# Patient Record
Sex: MALE
[De-identification: names, ages, dates, MRNs, and addresses within clinical notes are randomized per-mention and may not be internally consistent; named-entity substitution may affect disease eponyms.]

---

## 2022-07-25 ENCOUNTER — APPOINTMENT (OUTPATIENT)
Dept: OTOLARYNGOLOGY | Facility: CLINIC | Age: 77
End: 2022-07-25

## 2022-07-25 PROBLEM — Z00.00 ENCOUNTER FOR PREVENTIVE HEALTH EXAMINATION: Status: ACTIVE | Noted: 2022-07-25

## 2025-06-25 ENCOUNTER — INPATIENT (INPATIENT)
Facility: HOSPITAL | Age: 80
LOS: 0 days | Discharge: HOME HEALTH | End: 2025-06-26
Attending: INTERNAL MEDICINE | Admitting: INTERNAL MEDICINE
Payer: MEDICAID

## 2025-06-25 ENCOUNTER — APPOINTMENT (OUTPATIENT)
Dept: CT IMAGING | Facility: HOSPITAL | Age: 80
End: 2025-06-25

## 2025-06-25 ENCOUNTER — OUTPATIENT (OUTPATIENT)
Dept: OUTPATIENT SERVICES | Facility: HOSPITAL | Age: 80
LOS: 1 days | End: 2025-06-25

## 2025-06-25 VITALS
WEIGHT: 145.06 LBS | SYSTOLIC BLOOD PRESSURE: 103 MMHG | HEIGHT: 66 IN | RESPIRATION RATE: 17 BRPM | OXYGEN SATURATION: 94 % | TEMPERATURE: 98 F | DIASTOLIC BLOOD PRESSURE: 61 MMHG | HEART RATE: 78 BPM

## 2025-06-25 DIAGNOSIS — D69.6 THROMBOCYTOPENIA, UNSPECIFIED: ICD-10-CM

## 2025-06-25 DIAGNOSIS — J84.9 INTERSTITIAL PULMONARY DISEASE, UNSPECIFIED: ICD-10-CM

## 2025-06-25 DIAGNOSIS — D64.9 ANEMIA, UNSPECIFIED: ICD-10-CM

## 2025-06-25 DIAGNOSIS — S01.511A LACERATION WITHOUT FOREIGN BODY OF LIP, INITIAL ENCOUNTER: ICD-10-CM

## 2025-06-25 DIAGNOSIS — N40.0 BENIGN PROSTATIC HYPERPLASIA WITHOUT LOWER URINARY TRACT SYMPTOMS: ICD-10-CM

## 2025-06-25 DIAGNOSIS — D72.829 ELEVATED WHITE BLOOD CELL COUNT, UNSPECIFIED: ICD-10-CM

## 2025-06-25 LAB
ALBUMIN SERPL ELPH-MCNC: 3.1 G/DL — LOW (ref 3.3–5)
ALP SERPL-CCNC: 80 U/L — SIGNIFICANT CHANGE UP (ref 40–120)
ALT FLD-CCNC: 24 U/L — SIGNIFICANT CHANGE UP (ref 12–78)
ANION GAP SERPL CALC-SCNC: 5 MMOL/L — SIGNIFICANT CHANGE UP (ref 5–17)
ANISOCYTOSIS BLD QL: SLIGHT — SIGNIFICANT CHANGE UP
APPEARANCE UR: ABNORMAL
APTT BLD: 25.8 SEC — LOW (ref 26.1–36.8)
AST SERPL-CCNC: 30 U/L — SIGNIFICANT CHANGE UP (ref 15–37)
BACTERIA # UR AUTO: ABNORMAL /HPF
BASOPHILS # BLD AUTO: 0 K/UL — SIGNIFICANT CHANGE UP (ref 0–0.2)
BASOPHILS NFR BLD AUTO: 0 % — SIGNIFICANT CHANGE UP (ref 0–2)
BILIRUB SERPL-MCNC: 0.6 MG/DL — SIGNIFICANT CHANGE UP (ref 0.2–1.2)
BILIRUB UR-MCNC: NEGATIVE — SIGNIFICANT CHANGE UP
BLD GP AB SCN SERPL QL: SIGNIFICANT CHANGE UP
BUN SERPL-MCNC: 14 MG/DL — SIGNIFICANT CHANGE UP (ref 7–23)
CALCIUM SERPL-MCNC: 8.4 MG/DL — LOW (ref 8.5–10.1)
CHLORIDE SERPL-SCNC: 103 MMOL/L — SIGNIFICANT CHANGE UP (ref 96–108)
CO2 SERPL-SCNC: 27 MMOL/L — SIGNIFICANT CHANGE UP (ref 22–31)
COLOR SPEC: YELLOW — SIGNIFICANT CHANGE UP
CREAT SERPL-MCNC: 1.12 MG/DL — SIGNIFICANT CHANGE UP (ref 0.5–1.3)
DIFF PNL FLD: NEGATIVE — SIGNIFICANT CHANGE UP
EGFR: 67 ML/MIN/1.73M2 — SIGNIFICANT CHANGE UP
EGFR: 67 ML/MIN/1.73M2 — SIGNIFICANT CHANGE UP
EOSINOPHIL # BLD AUTO: 0.44 K/UL — SIGNIFICANT CHANGE UP (ref 0–0.5)
EOSINOPHIL NFR BLD AUTO: 2 % — SIGNIFICANT CHANGE UP (ref 0–6)
EPI CELLS # UR: PRESENT
GLUCOSE SERPL-MCNC: 162 MG/DL — HIGH (ref 70–99)
GLUCOSE UR QL: NEGATIVE MG/DL — SIGNIFICANT CHANGE UP
HCT VFR BLD CALC: 29.9 % — LOW (ref 39–50)
HGB BLD-MCNC: 9.7 G/DL — LOW (ref 13–17)
INR BLD: 1.15 RATIO — SIGNIFICANT CHANGE UP (ref 0.85–1.16)
KETONES UR QL: NEGATIVE MG/DL — SIGNIFICANT CHANGE UP
LEUKOCYTE ESTERASE UR-ACNC: ABNORMAL
LYMPHOCYTES # BLD AUTO: 13 % — SIGNIFICANT CHANGE UP (ref 13–44)
LYMPHOCYTES # BLD AUTO: 2.85 K/UL — SIGNIFICANT CHANGE UP (ref 1–3.3)
MANUAL SMEAR VERIFICATION: SIGNIFICANT CHANGE UP
MCHC RBC-ENTMCNC: 32.4 G/DL — SIGNIFICANT CHANGE UP (ref 32–36)
MCHC RBC-ENTMCNC: 40.2 PG — HIGH (ref 27–34)
MCV RBC AUTO: 124.1 FL — HIGH (ref 80–100)
MICROCYTES BLD QL: SLIGHT — SIGNIFICANT CHANGE UP
MONOCYTES # BLD AUTO: 7.01 K/UL — HIGH (ref 0–0.9)
MONOCYTES NFR BLD AUTO: 32 % — HIGH (ref 2–14)
NEUTROPHILS # BLD AUTO: 10.96 K/UL — HIGH (ref 1.8–7.4)
NEUTROPHILS NFR BLD AUTO: 50 % — SIGNIFICANT CHANGE UP (ref 43–77)
NITRITE UR-MCNC: NEGATIVE — SIGNIFICANT CHANGE UP
NRBC # BLD: 0 /100 WBCS — SIGNIFICANT CHANGE UP (ref 0–0)
NRBC BLD AUTO-RTO: SIGNIFICANT CHANGE UP /100 WBCS (ref 0–0)
NRBC BLD-RTO: 0 /100 WBCS — SIGNIFICANT CHANGE UP (ref 0–0)
PH UR: 7.5 — SIGNIFICANT CHANGE UP (ref 5–8)
PLAT MORPH BLD: ABNORMAL
PLATELET # BLD AUTO: 61 K/UL — LOW (ref 150–400)
POIKILOCYTOSIS BLD QL AUTO: SLIGHT — SIGNIFICANT CHANGE UP
POLYCHROMASIA BLD QL SMEAR: SLIGHT — SIGNIFICANT CHANGE UP
POTASSIUM SERPL-MCNC: 3.9 MMOL/L — SIGNIFICANT CHANGE UP (ref 3.5–5.3)
POTASSIUM SERPL-SCNC: 3.9 MMOL/L — SIGNIFICANT CHANGE UP (ref 3.5–5.3)
PROT SERPL-MCNC: 7.8 GM/DL — SIGNIFICANT CHANGE UP (ref 6–8.3)
PROT UR-MCNC: 30 MG/DL
PROTHROM AB SERPL-ACNC: 13.3 SEC — SIGNIFICANT CHANGE UP (ref 9.9–13.4)
RBC # BLD: 2.41 M/UL — LOW (ref 4.2–5.8)
RBC # FLD: 20.4 % — HIGH (ref 10.3–14.5)
RBC BLD AUTO: SIGNIFICANT CHANGE UP
RBC CASTS # UR COMP ASSIST: 2 /HPF — SIGNIFICANT CHANGE UP (ref 0–4)
SMUDGE CELLS # BLD: PRESENT — SIGNIFICANT CHANGE UP
SODIUM SERPL-SCNC: 135 MMOL/L — SIGNIFICANT CHANGE UP (ref 135–145)
SP GR SPEC: 1.01 — SIGNIFICANT CHANGE UP (ref 1–1.03)
SPHEROCYTES BLD QL SMEAR: SLIGHT — SIGNIFICANT CHANGE UP
UROBILINOGEN FLD QL: 0.2 MG/DL — SIGNIFICANT CHANGE UP (ref 0.2–1)
VARIANT LYMPHS # BLD: 3 % — SIGNIFICANT CHANGE UP (ref 0–6)
VARIANT LYMPHS NFR BLD MANUAL: 3 % — SIGNIFICANT CHANGE UP (ref 0–6)
WBC # BLD: 21.92 K/UL — HIGH (ref 3.8–10.5)
WBC # FLD AUTO: 21.92 K/UL — HIGH (ref 3.8–10.5)
WBC UR QL: 10 /HPF — HIGH (ref 0–5)

## 2025-06-25 PROCEDURE — 99222 1ST HOSP IP/OBS MODERATE 55: CPT

## 2025-06-25 PROCEDURE — 99285 EMERGENCY DEPT VISIT HI MDM: CPT

## 2025-06-25 PROCEDURE — 93010 ELECTROCARDIOGRAM REPORT: CPT

## 2025-06-25 PROCEDURE — 71045 X-RAY EXAM CHEST 1 VIEW: CPT | Mod: 26

## 2025-06-25 RX ORDER — OMEGA-3-ACID ETHYL ESTERS CAPSULES 1 G/1
1 CAPSULE, LIQUID FILLED ORAL
Refills: 0 | DISCHARGE

## 2025-06-25 RX ORDER — BUDESONIDE AND FORMOTEROL FUMARATE DIHYDRATE 80; 4.5 UG/1; UG/1
2 AEROSOL RESPIRATORY (INHALATION)
Refills: 0 | DISCHARGE

## 2025-06-25 RX ORDER — PSYLLIUM SEED (WITH DEXTROSE)
1 POWDER (GRAM) ORAL THREE TIMES A DAY
Refills: 0 | Status: DISCONTINUED | OUTPATIENT
Start: 2025-06-25 | End: 2025-06-26

## 2025-06-25 RX ORDER — DONEPEZIL HYDROCHLORIDE 5 MG/1
1 TABLET ORAL
Refills: 0 | DISCHARGE

## 2025-06-25 RX ORDER — MAGNESIUM, ALUMINUM HYDROXIDE 200-200 MG
30 TABLET,CHEWABLE ORAL EVERY 4 HOURS
Refills: 0 | Status: DISCONTINUED | OUTPATIENT
Start: 2025-06-25 | End: 2025-06-26

## 2025-06-25 RX ORDER — MELATONIN 5 MG
3 TABLET ORAL AT BEDTIME
Refills: 0 | Status: DISCONTINUED | OUTPATIENT
Start: 2025-06-25 | End: 2025-06-26

## 2025-06-25 RX ORDER — DICLOFENAC SODIUM 10 MG/G
2.25 GEL TOPICAL
Refills: 0 | DISCHARGE

## 2025-06-25 RX ORDER — ESCITALOPRAM OXALATE 20 MG/1
1 TABLET ORAL
Refills: 0 | DISCHARGE

## 2025-06-25 RX ORDER — DONEPEZIL HYDROCHLORIDE 5 MG/1
10 TABLET ORAL AT BEDTIME
Refills: 0 | Status: DISCONTINUED | OUTPATIENT
Start: 2025-06-25 | End: 2025-06-26

## 2025-06-25 RX ORDER — CALCIUM CARBONATE/VITAMIN D3 500MG-5MCG
1 TABLET ORAL
Refills: 0 | DISCHARGE

## 2025-06-25 RX ORDER — B1/B2/B3/B5/B6/B12/VIT C/FOLIC 500-0.5 MG
1 TABLET ORAL
Refills: 0 | DISCHARGE

## 2025-06-25 RX ORDER — ALBUTEROL SULFATE 2.5 MG/3ML
1 VIAL, NEBULIZER (ML) INHALATION EVERY 4 HOURS
Refills: 0 | Status: DISCONTINUED | OUTPATIENT
Start: 2025-06-25 | End: 2025-06-26

## 2025-06-25 RX ORDER — UMECLIDINIUM BROMIDE AND VILANTEROL TRIFENATATE 62.5; 25 UG/1; UG/1
1 POWDER RESPIRATORY (INHALATION)
Refills: 0 | DISCHARGE

## 2025-06-25 RX ORDER — TAMSULOSIN HYDROCHLORIDE 0.4 MG/1
0.4 CAPSULE ORAL AT BEDTIME
Refills: 0 | Status: DISCONTINUED | OUTPATIENT
Start: 2025-06-25 | End: 2025-06-26

## 2025-06-25 RX ORDER — ESCITALOPRAM OXALATE 20 MG/1
20 TABLET ORAL DAILY
Refills: 0 | Status: DISCONTINUED | OUTPATIENT
Start: 2025-06-25 | End: 2025-06-26

## 2025-06-25 RX ORDER — BUDESONIDE 0.25 MG/2ML
0.5 SUSPENSION RESPIRATORY (INHALATION)
Refills: 0 | Status: DISCONTINUED | OUTPATIENT
Start: 2025-06-25 | End: 2025-06-26

## 2025-06-25 RX ORDER — AMMONIUM LACTATE 12 %
1 LOTION (ML) TOPICAL
Refills: 0 | Status: DISCONTINUED | OUTPATIENT
Start: 2025-06-25 | End: 2025-06-26

## 2025-06-25 RX ORDER — B1/B2/B3/B5/B6/B12/VIT C/FOLIC 500-0.5 MG
1 TABLET ORAL DAILY
Refills: 0 | Status: DISCONTINUED | OUTPATIENT
Start: 2025-06-25 | End: 2025-06-26

## 2025-06-25 RX ORDER — BUDESONIDE 0.25 MG/2ML
2 SUSPENSION RESPIRATORY (INHALATION)
Refills: 0 | DISCHARGE

## 2025-06-25 RX ORDER — ACETAMINOPHEN 500 MG/5ML
1 LIQUID (ML) ORAL
Refills: 0 | DISCHARGE

## 2025-06-25 RX ORDER — TAMSULOSIN HYDROCHLORIDE 0.4 MG/1
1 CAPSULE ORAL
Refills: 0 | DISCHARGE

## 2025-06-25 RX ORDER — ALBUTEROL SULFATE 2.5 MG/3ML
1.25 VIAL, NEBULIZER (ML) INHALATION EVERY 8 HOURS
Refills: 0 | Status: DISCONTINUED | OUTPATIENT
Start: 2025-06-25 | End: 2025-06-25

## 2025-06-25 RX ORDER — ALBUTEROL SULFATE 2.5 MG/3ML
1 VIAL, NEBULIZER (ML) INHALATION
Refills: 0 | DISCHARGE

## 2025-06-25 RX ORDER — ONDANSETRON HCL/PF 4 MG/2 ML
4 VIAL (ML) INJECTION EVERY 8 HOURS
Refills: 0 | Status: DISCONTINUED | OUTPATIENT
Start: 2025-06-25 | End: 2025-06-26

## 2025-06-25 RX ORDER — CALCIUM CARBONATE/VITAMIN D3 500MG-5MCG
1 TABLET ORAL DAILY
Refills: 0 | Status: DISCONTINUED | OUTPATIENT
Start: 2025-06-25 | End: 2025-06-26

## 2025-06-25 RX ORDER — PSYLLIUM SEED (WITH DEXTROSE)
3.75 POWDER (GRAM) ORAL
Refills: 0 | DISCHARGE

## 2025-06-25 RX ORDER — ACETAMINOPHEN 500 MG/5ML
650 LIQUID (ML) ORAL EVERY 6 HOURS
Refills: 0 | Status: DISCONTINUED | OUTPATIENT
Start: 2025-06-25 | End: 2025-06-26

## 2025-06-25 RX ORDER — AMMONIUM LACTATE 12 %
1 LOTION (ML) TOPICAL
Refills: 0 | DISCHARGE

## 2025-06-25 RX ORDER — ALBUTEROL SULFATE 2.5 MG/3ML
3 VIAL, NEBULIZER (ML) INHALATION
Refills: 0 | DISCHARGE

## 2025-06-25 NOTE — H&P ADULT - NSHPPHYSICALEXAM_GEN_ALL_CORE
T(C): 36.7 (06-25-25 @ 19:39), Max: 36.8 (06-25-25 @ 13:32)  HR: 73 (06-25-25 @ 19:39) (73 - 78)  BP: 107/56 (06-25-25 @ 19:39) (103/61 - 121/63)  RR: 20 (06-25-25 @ 19:39) (17 - 20)  SpO2: 94% (06-25-25 @ 19:39) (94% - 97%)    CONSTITUTIONAL: Well groomed, no apparent distress  EYES: PERRLA and symmetric, EOMI  ENMT: Oral mucosa with moist membranes, R. lower lip with dried blood on laceration  RESP: No respiratory distress, no use of accessory muscles; CTA b/l  CV: RRR  GI: Soft, NT, ND  NEURO: alert

## 2025-06-25 NOTE — ED ADULT TRIAGE NOTE - CHIEF COMPLAINT QUOTE
from IR for bleeding around the mouth, as per son, patient noticed this morning when he was brushing his teeth that he was having bleeding, son states patient told him that he hit his face 2 weeks ago on the door, son noticed swelling around the mouth 3-4 days ago, SHELIA davenport brought patient down for evaluation, was in IR for bone marrow biopsy  hx of thrombocytopenia, anemia, dementia

## 2025-06-25 NOTE — ED ADULT NURSE NOTE - OBJECTIVE STATEMENT
Pt presents to ED from IR for bleeding around the mouth. As per pt's son, patient noticed this morning when he was brushing his teeth that he bleeding. son also states pt told him that he hit his face 2 weeks ago on the door. Son reports  noticing swelling around the mouth 3-4 days ago, SHELIA davenport brought patient down for evaluation, was in IR for bone marrow biopsy. PMH of thrombocytopenia, anemia, dementia. VSS, no s/s of acute distress noted at this time.

## 2025-06-25 NOTE — H&P ADULT - NSICDXPASTMEDICALHX_GEN_ALL_CORE_FT
PAST MEDICAL HISTORY:  Anemia     BPH (benign prostatic hyperplasia)     Dementia     Interstitial lung disease     Thrombocytopenia

## 2025-06-25 NOTE — ED PROVIDER NOTE - ATTENDING APP SHARED VISIT CONTRIBUTION OF CARE
79yo male with pmh htn, dm, ILD, bph, dementia, lupus, gerd, mdd, hld, presenting with bleeding from mouth.  He was at IR today for scheduled bone marrow biopsy working up low blood counts when he reported the ongoing bleeding so was sent to ED for further eval.  Not currently bleeding.  Feels fine now.  Concern for thrombocytopenia, anemia.  Will get labs for counts and reassess.

## 2025-06-25 NOTE — GOALS OF CARE CONVERSATION - ADVANCED CARE PLANNING - CONVERSATION DETAILS
Patient is Mohawk speaking but preferred son to translate. Patient is unable to participate in goals of care discussion due to dementia. Discussed with son, Brijesh Chris ([phone]) who states code status is full code. Would want chest compressions and intubation if needed. Wants all life-sustaining measures. No limitations on care at this time.
157.48

## 2025-06-25 NOTE — H&P ADULT - NSHPLABSRESULTS_GEN_ALL_CORE
9.7    21.92 )-----------( 61       ( 2025 14:52 )             29.9     135  |  103  |  14  ----------------------------<  162[H]       3.9   |  27  |  1.12    Ca    8.4[L]      2025 14:52    TPro  7.8  /  Alb  3.1[L]  /  TBili  0.6  /  DBili  x   /  AST  30  /  ALT  24  /  AlkPhos  80      PT/INR: 13.3/1.15 (25 @ 14:52)  PTT: 25.8 (25 @ 14:52)    Urinalysis Basic - ( 2025 20:15 )  Color: Yellow / Appearance: Cloudy / S.014 / pH: 7.5  Gluc: Negative mg/dL / Ketone: x  / Bili: Negative / Urobili: 0.2 mg/dL   Blood: Negative / Protein: 30 mg/dL / Nitrite: Negative   Leuk Esterase: Trace / RBC: 2 /HPF / WBC 10 /HPF   Sq Epi: x / Bacteria: Moderate /HPF  Hyaline Casts: x/WBC Casts: x    Urinalysis with Rflx Culture (collected 2025 20:15) 9.7    21.92 )-----------( 61       ( 2025 14:52 )             29.9     135  |  103  |  14  ----------------------------<  162[H]       3.9   |  27  |  1.12    Ca    8.4[L]      2025 14:52    TPro  7.8  /  Alb  3.1[L]  /  TBili  0.6  /  DBili  x   /  AST  30  /  ALT  24  /  AlkPhos  80      PT/INR: 13.3/1.15 (25 @ 14:52)  PTT: 25.8 (25 @ 14:52)    Urinalysis Basic - ( 2025 20:15 )  Color: Yellow / Appearance: Cloudy / S.014 / pH: 7.5  Gluc: Negative mg/dL / Ketone: x  / Bili: Negative / Urobili: 0.2 mg/dL   Blood: Negative / Protein: 30 mg/dL / Nitrite: Negative   Leuk Esterase: Trace / RBC: 2 /HPF / WBC 10 /HPF   Sq Epi: x / Bacteria: Moderate /HPF  Hyaline Casts: x/WBC Casts: x    Urinalysis with Rflx Culture (collected 2025 20:15)    Chest x-ray 25

## 2025-06-25 NOTE — ED PROVIDER NOTE - CLINICAL SUMMARY MEDICAL DECISION MAKING FREE TEXT BOX
BANDAR Arana: 80-year-old male with past medical history of hypertension, type 2 diabetes, interstitial pulmonary disease, BPH, dementia, lupus, GERD, major depressive disorder, hyperlipidemia, thrombocytopenia, anemia, currently being worked up for low blood counts by [place] [facility] presents emergency room with son from IR for evaluation of bleeding lip/gums.  Patient was here for bone marrow biopsy today with IR however was found to have bleeding from the lips and upon further discussion, son reported that patient has had bleeding gums recently so was sent to the ER without biopsy.  Denies fever, chills, chest pain, shortness of, difficulty breathing, abdominal pain, nausea, vomiting, diarrhea or urinary complaints.  Vital signs stable, well-appearing no acute distress, heart and lungs normal on exam, abdomen soft nontender nondistended, scab to right lower lateral lip without active bleeding, no bruising to the gums noted.  Concern for worsening thrombocytopenia, no concern for infection at this time. Will get labs and touchbase with patients hematologist.

## 2025-06-25 NOTE — ED PROVIDER NOTE - PROGRESS NOTE DETAILS
BANDAR Arana: Patient hematologist Dr Deysi Steel, patient being worked up for leukemia vs CMML, platelets baseline 61 and hemoglobin 9.1 last month. Hemoglobin and platelets baseline for patient however her white blood cell count elevated to 22 without obvious signs of infection per son.  Patient had admission to near [facility] in the past for respiratory issues, unclear blood and was given oxygen but has been doing better since.  Denies urinary symptoms.  Spoke with hematologist who would like a biopsy sooner rather than later, patient requiring sedation for therefore cannot be done outpatient and wife discussed with the hospital.  Discussed admission for IR on Friday in addition to further infectious workup with son and agrees to stay.  Pending urine results, chest x-ray, will get blood cultures although does not meet criteria at this time.  All questions answered, will plan to admit BANDAR Arana: Patient hematologist Dr Deysi Steel, patient being worked up for leukemia vs CMML, platelets baseline 61 and hemoglobin 9.1 last month. Hemoglobin and platelets baseline for patient however her white blood cell count elevated to 22 without obvious signs of infection per son.  [facility], unclear blood and was given oxygen but has been doing better since.  Denies urinary symptoms.  Spoke with hematologist who would like a biopsy sooner rather than later, patient requiring sedation for therefore cannot be done outpatient and wife discussed with the hospital.  Discussed admission for IR on Friday in addition to further infectious workup with son and agrees to stay.  Pending urine results, chest x-ray, will get blood cultures although does not meet criteria at this time.  Repeat vital signs show hypoxia 89-90% on RA, sat up and placed on 2L NC, cardiac monitor also requested. Son states when patient is admitted he needs oxygen sometimes but not home. Will r/o PNA on CXR. All questions answered, will plan to admit    Son phone number [phone] Brijesh Chris

## 2025-06-25 NOTE — H&P ADULT - ASSESSMENT
Medication reconciliation completed using med list provided by nan Ирина Chris is a 80 year old male with PMHx of ILD, BPH, dementia, and hx of anemia and thrombocytopenia (being followed by [facility]) who presented to the ED on 6/25/25 for complaints of being sent by IR and admitted for need for IR biopsy.    Need for IR biopsy  Son reports patient is being followed by hematology/oncology, Dr. Deysi Steel at [facility] for anemia and thrombocytopenia  Blood work last month with hemoglobin 9.1 and platelets 61K  Referred to IR for bone marrow biopsy and being worked up for possible CML, appointment with IR was today  Procedure aborted for miscommunication? as IR staff heard patient was bleeding  Hgb 9.7 and plts 61K on admission, which is similar to blood work from last month on son's phone  Will need IR consult for BM biopsy on Friday given procedure not performed today and IR is not available here at [facility] tomorrow    Lip laceration secondary to trauma  Son reports patient hit his right bottom lip against bathroom door two days ago and it was bleeding  Now scabbed over but patient tends to pick at scab which then results in bleeding again  No bleeding from gums or inside mouth  Monitor    Leukocytosis, unclear etiology  Afebrile, WBC 21.92K on admission  U/A with proteinuria, trace leuks, negative nitrites, WBC 10, RBC 2, moderate bacteria, squamous epithelial cells present  Will hold off on antibiotics at this time as son reports "he always urinates a lot and needs to wear a diaper"  F/u urine culture, monitor WBC trend      Chronic medical conditions:  ILD: PTA albuterol PRN, Pulmicort, Symbicort and Anoro reordered as Advair  BPH: PTA tamsulosin 0.4 mg  Dementia: PTA donepezil 10 mg, frequent turning and repositioning q2    Medication reconciliation completed using med list provided by son.    Plan of care discussed with son over the phone.

## 2025-06-25 NOTE — ED ADULT NURSE NOTE - NSSUHOSCREENINGYN_ED_ALL_ED
I have personally seen and examined the patient. I have collaborated with and supervised the
Yes - the patient is able to be screened

## 2025-06-25 NOTE — H&P ADULT - HISTORY OF PRESENT ILLNESS
Ирина Chris is a 80 year old male*** Ирина Chris is a 80 year old male with PMHx of ILD, BPH, dementia, and hx of anemia and thrombocytopenia (being followed by [facility]) who presented to the ED on 6/25/25 for complaints of being sent by IR.    Patient is being followed by hematology/oncology, Dr. Deysi Steel at [facility] for anemia and thrombocytopenia. Blood work last month with hemoglobin 9.1 and platelets 61K. Referred to IR for bone marrow biopsy and being worked up for possible CML. Appointment with IR was today. However, son states IR did not listen to him or patient which is why procedure was not performed and patient was sent to the ER for further evaluation. Son reports patient hit his right bottom lip against bathroom door two days ago and it was bleeding. Now scabbed over but patient tends to pick at scab which then results in bleeding again. So when IR heard patient was bleeding, they immediately aborted the procedure. No bleeding from gums or inside mouth. Son is very upset at IR department and concerned as patient needs biopsy for further evaluation.     In the ED, VSS. WBC 21.92K, hgb 9.7, plts 61K, corrected calcium 9.1. U/A with proteinuria, trace leuks, negative nitrites, WBC 10, RBC 2, moderate bacteria, squamous epithelial cells present. Did not receive any medications.

## 2025-06-25 NOTE — H&P ADULT - TIME BILLING
coordination of care with ER PA and ER RN, obtaining history, performing a physical examination, reviewing and interpreting labs and imaging, ordering further studies and tests, explaining the diagnosis and treatment plan to son over the phone, completing medication reconciliation, and documentation as above.

## 2025-06-26 ENCOUNTER — TRANSCRIPTION ENCOUNTER (OUTPATIENT)
Age: 80
End: 2025-06-26

## 2025-06-26 VITALS
RESPIRATION RATE: 17 BRPM | HEART RATE: 72 BPM | TEMPERATURE: 98 F | OXYGEN SATURATION: 97 % | DIASTOLIC BLOOD PRESSURE: 75 MMHG | SYSTOLIC BLOOD PRESSURE: 109 MMHG

## 2025-06-26 LAB
HCT VFR BLD CALC: 30.6 % — LOW (ref 39–50)
HGB BLD-MCNC: 9.8 G/DL — LOW (ref 13–17)
MCHC RBC-ENTMCNC: 32 G/DL — SIGNIFICANT CHANGE UP (ref 32–36)
MCHC RBC-ENTMCNC: 39.5 PG — HIGH (ref 27–34)
MCV RBC AUTO: 123.4 FL — HIGH (ref 80–100)
NRBC BLD AUTO-RTO: 0 /100 WBCS — SIGNIFICANT CHANGE UP (ref 0–0)
PLATELET # BLD AUTO: 64 K/UL — LOW (ref 150–400)
RBC # BLD: 2.48 M/UL — LOW (ref 4.2–5.8)
RBC # FLD: 20 % — HIGH (ref 10.3–14.5)
WBC # BLD: 26.6 K/UL — HIGH (ref 3.8–10.5)
WBC # FLD AUTO: 26.6 K/UL — HIGH (ref 3.8–10.5)

## 2025-06-26 PROCEDURE — 99239 HOSP IP/OBS DSCHRG MGMT >30: CPT

## 2025-06-26 RX ADMIN — TAMSULOSIN HYDROCHLORIDE 0.4 MILLIGRAM(S): 0.4 CAPSULE ORAL at 01:32

## 2025-06-26 RX ADMIN — Medication 20 MILLIGRAM(S): at 15:33

## 2025-06-26 RX ADMIN — ESCITALOPRAM OXALATE 20 MILLIGRAM(S): 20 TABLET ORAL at 15:33

## 2025-06-26 RX ADMIN — BUDESONIDE 0.5 MILLIGRAM(S): 0.25 SUSPENSION RESPIRATORY (INHALATION) at 05:24

## 2025-06-26 RX ADMIN — BUDESONIDE 0.5 MILLIGRAM(S): 0.25 SUSPENSION RESPIRATORY (INHALATION) at 17:09

## 2025-06-26 RX ADMIN — DONEPEZIL HYDROCHLORIDE 10 MILLIGRAM(S): 5 TABLET ORAL at 01:32

## 2025-06-26 RX ADMIN — Medication 650 MILLIGRAM(S): at 09:20

## 2025-06-26 RX ADMIN — Medication 1 TABLET(S): at 15:34

## 2025-06-26 RX ADMIN — Medication 1 APPLICATION(S): at 06:17

## 2025-06-26 RX ADMIN — Medication 1 DOSE(S): at 06:16

## 2025-06-26 RX ADMIN — Medication 650 MILLIGRAM(S): at 10:10

## 2025-06-26 NOTE — DISCHARGE NOTE PROVIDER - NSDCFUADDINST_GEN_ALL_CORE_FT
1) It is important to see your primary physician as well as other necessary consultants within next 7-10 days to perform a comprehensive medical review.  Call their offices for an appointment as soon as you leave the hospital.  If you do not have a primary physician or unable to reach your PCP, contact the [facility] Physician Referral Service at [phone].  Your medical issues appear to be stable at this time, but if your symptoms recur or worsen, contact your physicians and/or return to the hospital if necessary.  If you encounter any issues or questions with your medication, call your physicians before stopping the medication.    2) Please access [facility] Patient portal (as instructed on the discharge paperwork) to access your medical records at any time after discharge.

## 2025-06-26 NOTE — PROGRESS NOTE ADULT - SUBJECTIVE AND OBJECTIVE BOX
CHIEF COMPLAINT: FU of pending bone marrow biopsy to rule out or rule in CML  [facility]  [id number] used but patient reported hard of hearing and could not participate in it effectively.     PHYSICAL EXAM:  GENERAL: Moderately built, no acute distress   CHEST/LUNG:  No wheezing, no crackles   HEART: Regular rate and rhythm; No murmurs  ABDOMEN: Soft, Nontender, Nondistended; Bowel sounds present  EXTREMITIES:  No clubbing, cyanosis, or edema  No active gross bleeding     OBJECTIVE DATA:   Vital Signs Last 24 Hrs  T(C): 36.7 (2025 00:22), Max: 36.8 (2025 13:32)  T(F): 98 (2025 00:22), Max: 98.2 (2025 13:32)  HR: 75 (2025 08:21) (63 - 88)  BP: 112/51 (2025 08:21) (93/48 - 121/63)  BP(mean): 73 (2025 19:39) (73 - 82)  RR: 18 (2025 08:21) (17 - 20)  SpO2: 98% (2025 08:21) (94% - 100%)    Parameters below as of 2025 08:21  Patient On (Oxygen Delivery Method): nasal cannula  O2 Flow (L/min): 2  Daily Height in cm: 167.64 (2025 13:32)    LABS:                        9.8    26.60 )-----------( 64       ( 2025 06:33 )             30.6             06-25    135  |  103  |  14  ----------------------------<  162[H]  3.9   |  27  |  1.12    Ca    8.4[L]      2025 14:52    TPro  7.8  /  Alb  3.1[L]  /  TBili  0.6  /  DBili  x   /  AST  30  /  ALT  24  /  AlkPhos  80  06-25              PT/INR - ( 2025 14:52 )   PT: 13.3 sec;   INR: 1.15 ratio         PTT - ( 2025 14:52 )  PTT:25.8 sec  Urinalysis Basic - ( 2025 20:15 )    Color: Yellow / Appearance: Cloudy / S.014 / pH: x  Gluc: x / Ketone: x  / Bili: Negative / Urobili: 0.2 mg/dL   Blood: x / Protein: 30 mg/dL / Nitrite: Negative   Leuk Esterase: Trace / RBC: 2 /HPF / WBC 10 /HPF   Sq Epi: x / Non Sq Epi: x / Bacteria: Moderate /HPF    Interval Radiology studies: reviewed by me  < from: Xray Chest 1 View- PORTABLE-Urgent (Xray Chest 1 View- PORTABLE-Urgent .) (25 @ 19:37) >  IMPRESSION:  RIGHT perihilar/lower zone interstitial/airspace disease.    < end of copied text >      MEDICATIONS  (STANDING):  ammonium lactate 12% Lotion 1 Application(s) Topical two times a day  budesonide    Inhalation Suspension 0.5 milliGRAM(s) Inhalation two times a day  calcium carbonate 1250 mG  + Vitamin D (OsCal 500 + D) 1 Tablet(s) Oral daily  donepezil 10 milliGRAM(s) Oral at bedtime  escitalopram 20 milliGRAM(s) Oral daily  famotidine    Tablet 20 milliGRAM(s) Oral daily  fluticasone propionate/ salmeterol 250-50 MICROgram(s) Diskus 1 Dose(s) Inhalation two times a day  multivitamin 1 Tablet(s) Oral daily  tamsulosin 0.4 milliGRAM(s) Oral at bedtime    MEDICATIONS  (PRN):  acetaminophen     Tablet .. 650 milliGRAM(s) Oral every 6 hours PRN Temp greater or equal to 38C (100.4F), Mild Pain (1 - 3)  albuterol    90 MICROgram(s) HFA Inhaler 1 Puff(s) Inhalation every 4 hours PRN for shortness of breath and/or wheezing  aluminum hydroxide/magnesium hydroxide/simethicone Suspension 30 milliLiter(s) Oral every 4 hours PRN Dyspepsia  cetirizine 10 milliGRAM(s) Oral daily PRN seasonal allergies  melatonin 3 milliGRAM(s) Oral at bedtime PRN Insomnia  ondansetron Injectable 4 milliGRAM(s) IV Push every 8 hours PRN Nausea and/or Vomiting  psyllium Powder 1 Packet(s) Oral three times a day PRN constipation

## 2025-06-26 NOTE — DISCHARGE NOTE PROVIDER - HOSPITAL COURSE
Ирина Chris is a 80 year old male with PMHx of ILD, BPH, dementia, and hx of anemia and thrombocytopenia (being followed by [facility]) who presented to the ED on 6/25/25 for complaints of being sent by IR and admitted for need for IR biopsy.    Leukocytosis. suspected CML  Son reports patient is being followed by hematology/oncology, Dr. Deysi Steel at [facility] for anemia and thrombocytopenia  Blood work last month with hemoglobin 9.1 and platelets 61K  Referred to IR for bone marrow biopsy and being worked up for possible CML. Procedure was cancelled yesterday as anesthesiologist was told patient has swelling face and bleeding.   I discussed with Febah about possible biopsy tomorrow but IR does not have anesthesiologist available tomorrow. I called and updated son about it. IR will contact son to rescheduled the BM biopsy in July.     Lip laceration secondary to trauma  healing. no active gross bleeding.         Chronic medical conditions:  ILD: cont home management.   BPH: PTA tamsulosin 0.4 mg  Dementia: PTA donepezil 10 mg.    Seen and examined by me today. Vitals stable.   DC time spent by me face to face excluding billable procedures 38 mins

## 2025-06-26 NOTE — PATIENT PROFILE ADULT - FALL HARM RISK - HARM RISK INTERVENTIONS
Assistance with ambulation/Assistance OOB with selected safe patient handling equipment/Communicate Risk of Fall with Harm to all staff/Discuss with provider need for PT consult/Monitor gait and stability/Reinforce activity limits and safety measures with patient and family/Tailored Fall Risk Interventions/Visual Cue: Yellow wristband and red socks/Bed in lowest position, wheels locked, appropriate side rails in place/Call bell, personal items and telephone in reach/Instruct patient to call for assistance before getting out of bed or chair/Non-slip footwear when patient is out of bed/[place] to call system/Physically safe environment - no spills, clutter or unnecessary equipment/Purposeful Proactive Rounding/Room/bathroom lighting operational, light cord in reach

## 2025-06-26 NOTE — PROGRESS NOTE ADULT - ASSESSMENT
Ирина Chris is a 80 year old male with PMHx of ILD, BPH, dementia, and hx of anemia and thrombocytopenia (being followed by [facility]) who presented to the ED on 6/25/25 for complaints of being sent by IR and admitted for need for IR biopsy.    Need for IR biopsy  Son reports patient is being followed by hematology/oncology, Dr. Deysi Steel at [facility] for anemia and thrombocytopenia  Blood work last month with hemoglobin 9.1 and platelets 61K  Referred to IR for bone marrow biopsy and being worked up for possible CML, appointment with IR was today  Procedure aborted for miscommunication? as IR staff heard patient was bleeding  Hgb 9.7 and plts 61K on admission, which is similar to blood work from last month on son's phone  Will need IR consult for BM biopsy on Friday given procedure not performed today and IR is not available [facility] tomorrow    Lip laceration secondary to trauma  healing. no active gross bleeding.     Leukocytosis, unclear etiology  Afebrile, WBC 21.92K on admission  U/A with proteinuria, trace leuks, negative nitrites, WBC 10, RBC 2, moderate bacteria, squamous epithelial cells present  Will hold off on antibiotics at this time as son reports "he always urinates a lot and needs to wear a diaper"  F/u urine culture, monitor WBC trend. IR consulted for bone marrow biopsy tomorrow. Follow recs.       Chronic medical conditions:  ILD: PTA albuterol PRN, Pulmicort, Symbicort and Anoro reordered as Advair  BPH: PTA tamsulosin 0.4 mg  Dementia: PTA donepezil 10 mg, frequent turning and repositioning q2. Re-orientation.     FC  SCDs  Time spent by me managing the patient including but not limited to reviewing the chart, discussion with the IDR team (nurse//care manager/ACP), physical exam and assessment and plan is 36 mins

## 2025-06-26 NOTE — DISCHARGE NOTE PROVIDER - PROVIDER TOKENS
FREE:[LAST:[Follow up with IR to reschduled BM biopsy electively],PHONE:[(   )    -],FAX:[(   )    -]]

## 2025-06-26 NOTE — DISCHARGE NOTE PROVIDER - CARE PROVIDER_API CALL
Follow up with IR to reschduled BM biopsy electively,   Phone: (   )    -  Fax: (   )    -  Follow Up Time:

## 2025-06-26 NOTE — DISCHARGE NOTE NURSING/CASE MANAGEMENT/SOCIAL WORK - FINANCIAL ASSISTANCE
[facility] provides services at a reduced cost to those who are determined to be eligible through [facility]’s financial assistance program. Information regarding [facility]’s financial assistance program can be found by going to [email or web address] or by calling [phone].

## 2025-06-26 NOTE — DISCHARGE NOTE NURSING/CASE MANAGEMENT/SOCIAL WORK - PATIENT PORTAL LINK FT
You can access the FollowMyHealth Patient Portal offered by [facility] by registering at the following website: [email or web address]. By joining [facility]’s FollowMyHealth portal, you will also be able to view your health information using other applications (apps) compatible with our system.

## 2025-06-26 NOTE — DISCHARGE NOTE PROVIDER - NSDCMRMEDTOKEN_GEN_ALL_CORE_FT
acetaminophen 500 mg oral tablet: 1 tab(s) orally once a day  Albuterol (Eqv-ProAir HFA) 90 mcg/inh inhalation aerosol: 1 puff(s) inhaled every 4 hours as needed for  shortness of breath and/or wheezing  albuterol 1.25 mg/3 mL (0.042%) inhalation solution: 3 milliliter(s) by nebulizer every 8 hours as needed for  shortness of breath and/or wheezing  Amlactin 12% topical lotion: Apply topically to affected area 2 times a day  Anoro Ellipta 62.5 mcg-25 mcg/inh inhalation powder: 1 puff(s) inhaled once a day  budesonide 0.5 mg/2 mL inhalation suspension: 2 milliliter(s) by nebulizer 2 times a day  budesonide-formoterol 160 mcg-4.5 mcg/inh inhalation aerosol: 2 puff(s) inhaled 2 times a day  calcium (as carbonate)-vitamin D 500 mg-3.125 mcg (125 intl units) oral tablet: 1 tab(s) orally once a day  cetirizine 10 mg oral tablet: 1 tab(s) orally once a day as needed for seasonal allergies  diclofenac 1% topical gel: Apply topically to affected area every 6 hours  donepezil 10 mg oral tablet: 1 tab(s) orally once a day  escitalopram 20 mg oral tablet: 1 tab(s) orally once a day  famotidine 20 mg oral tablet: 1 tab(s) orally once a day  Multiple Vitamins oral tablet: 1 tab(s) orally once a day  Omega-3 1000 mg oral capsule: 1 cap(s) orally once a day  Reguloid oral powder for reconstitution: 3.75 gram(s) orally 3 times a day as needed for  constipation  tamsulosin 0.4 mg oral capsule: 1 cap(s) orally once a day

## 2025-06-27 PROBLEM — J84.9 INTERSTITIAL PULMONARY DISEASE, UNSPECIFIED: Chronic | Status: ACTIVE | Noted: 2025-06-25

## 2025-06-27 PROBLEM — D69.6 THROMBOCYTOPENIA, UNSPECIFIED: Chronic | Status: ACTIVE | Noted: 2025-06-25

## 2025-06-27 PROBLEM — D64.9 ANEMIA, UNSPECIFIED: Chronic | Status: ACTIVE | Noted: 2025-06-25

## 2025-06-27 PROBLEM — N40.0 BENIGN PROSTATIC HYPERPLASIA WITHOUT LOWER URINARY TRACT SYMPTOMS: Chronic | Status: ACTIVE | Noted: 2025-06-25

## 2025-06-27 LAB
CULTURE RESULTS: NO GROWTH — SIGNIFICANT CHANGE UP
SPECIMEN SOURCE: SIGNIFICANT CHANGE UP

## 2025-07-01 DIAGNOSIS — K21.9 GASTRO-ESOPHAGEAL REFLUX DISEASE WITHOUT ESOPHAGITIS: ICD-10-CM
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
## 2025-07-01 DIAGNOSIS — F03.90 UNSPECIFIED DEMENTIA, UNSPECIFIED SEVERITY, WITHOUT BEHAVIORAL DISTURBANCE, PSYCHOTIC DISTURBANCE, MOOD DISTURBANCE, AND ANXIETY: ICD-10-CM

## 2025-07-01 DIAGNOSIS — E78.5 HYPERLIPIDEMIA, UNSPECIFIED: ICD-10-CM

## 2025-07-01 DIAGNOSIS — N40.0 BENIGN PROSTATIC HYPERPLASIA WITHOUT LOWER URINARY TRACT SYMPTOMS: ICD-10-CM

## 2025-07-01 DIAGNOSIS — E11.9 TYPE 2 DIABETES MELLITUS WITHOUT COMPLICATIONS: ICD-10-CM

## 2025-07-01 DIAGNOSIS — L93.2 OTHER LOCAL LUPUS ERYTHEMATOSUS: ICD-10-CM

## 2025-07-01 DIAGNOSIS — S01.511A LACERATION WITHOUT FOREIGN BODY OF LIP, INITIAL ENCOUNTER: ICD-10-CM

## 2025-07-01 DIAGNOSIS — F32.9 MAJOR DEPRESSIVE DISORDER, SINGLE EPISODE, UNSPECIFIED: ICD-10-CM
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
## 2025-07-01 DIAGNOSIS — I10 ESSENTIAL (PRIMARY) HYPERTENSION: ICD-10-CM

## 2025-07-01 DIAGNOSIS — J84.9 INTERSTITIAL PULMONARY DISEASE, UNSPECIFIED: ICD-10-CM

## 2025-07-01 DIAGNOSIS — C92.10 CHRONIC MYELOID LEUKEMIA, BCR/ABL-POSITIVE, NOT HAVING ACHIEVED REMISSION: ICD-10-CM

## 2025-07-01 DIAGNOSIS — D69.6 THROMBOCYTOPENIA, UNSPECIFIED: ICD-10-CM

## 2025-07-01 DIAGNOSIS — D64.9 ANEMIA, UNSPECIFIED: ICD-10-CM
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
## 2025-07-09 ENCOUNTER — RESULT REVIEW (OUTPATIENT)
Age: 80
End: 2025-07-09

## 2025-07-09 ENCOUNTER — TRANSCRIPTION ENCOUNTER (OUTPATIENT)
Age: 80
End: 2025-07-09

## 2025-07-09 ENCOUNTER — OUTPATIENT (OUTPATIENT)
Dept: OUTPATIENT SERVICES | Facility: HOSPITAL | Age: 80
LOS: 1 days | End: 2025-07-09
Payer: MEDICAID

## 2025-07-09 ENCOUNTER — APPOINTMENT (OUTPATIENT)
Dept: INTERVENTIONAL RADIOLOGY/VASCULAR | Facility: HOSPITAL | Age: 80
End: 2025-07-09

## 2025-07-09 VITALS
SYSTOLIC BLOOD PRESSURE: 111 MMHG | TEMPERATURE: 98 F | OXYGEN SATURATION: 95 % | RESPIRATION RATE: 16 BRPM | HEART RATE: 65 BPM | DIASTOLIC BLOOD PRESSURE: 58 MMHG

## 2025-07-09 VITALS
HEART RATE: 61 BPM | DIASTOLIC BLOOD PRESSURE: 57 MMHG | SYSTOLIC BLOOD PRESSURE: 113 MMHG | OXYGEN SATURATION: 95 % | RESPIRATION RATE: 17 BRPM | TEMPERATURE: 98 F

## 2025-07-09 DIAGNOSIS — D72.829 ELEVATED WHITE BLOOD CELL COUNT, UNSPECIFIED: ICD-10-CM

## 2025-07-09 PROCEDURE — 88342 IMHCHEM/IMCYTCHM 1ST ANTB: CPT | Mod: 26,59

## 2025-07-09 PROCEDURE — 88305 TISSUE EXAM BY PATHOLOGIST: CPT | Mod: 26

## 2025-07-09 PROCEDURE — 88291 CYTO/MOLECULAR REPORT: CPT

## 2025-07-09 PROCEDURE — 38221 DX BONE MARROW BIOPSIES: CPT | Mod: 50

## 2025-07-09 PROCEDURE — 88360 TUMOR IMMUNOHISTOCHEM/MANUAL: CPT | Mod: 26

## 2025-07-09 PROCEDURE — 88341 IMHCHEM/IMCYTCHM EA ADD ANTB: CPT | Mod: 26,59

## 2025-07-09 PROCEDURE — 85097 BONE MARROW INTERPRETATION: CPT

## 2025-07-09 PROCEDURE — 88313 SPECIAL STAINS GROUP 2: CPT | Mod: 26

## 2025-07-09 PROCEDURE — 88189 FLOWCYTOMETRY/READ 16 & >: CPT | Mod: 59

## 2025-07-09 PROCEDURE — 77012 CT SCAN FOR NEEDLE BIOPSY: CPT | Mod: 26

## 2025-07-09 NOTE — ASU DISCHARGE PLAN (ADULT/PEDIATRIC) - NS MD DC FALL RISK RISK
For information on Fall & Injury Prevention, visit: [email or web address] OR  [email or web address] OR  https://www.cdc.gov/steadi/patient.html

## 2025-07-09 NOTE — PROCEDURE NOTE - PROCEDURE FINDINGS AND DETAILS
CT-guided right iliac bone marrow biopsy. 1 core, 1 clot, 4 aspirate obtained and sent for path. No complications. Full report to follow.

## 2025-07-09 NOTE — ASU DISCHARGE PLAN (ADULT/PEDIATRIC) - FINANCIAL ASSISTANCE
[facility] provides services at a reduced cost to those who are determined to be eligible through [facility]’s financial assistance program. Information regarding [facility]’s financial assistance program can be found by going to [email or web address] or by calling [phone].

## 2025-07-09 NOTE — PRE PROCEDURE NOTE - PRE PROCEDURE EVALUATION
Interventional Radiology    HPI: 80y Male with PMHx of ILD, BPH, dementia, and hx of anemia and thrombocytopenia (being followed by [facility]) referred for bone marrow biopsy.     Allergies: No Known Allergies    Medications (Abx/Cardiac/Anticoagulation/Blood Products)      Data:    T(C): --  HR: --  BP: --  RR: --  SpO2: --    Exam  General: No acute distress  Chest: Non labored breathing          Imaging: Reviewed    Plan: 80y Male presents for bone marrow biopsy  -Risks/Benefits/alternatives explained with the patient and/or healthcare proxy and witnessed informed consent obtained.

## 2025-07-09 NOTE — ASU DISCHARGE PLAN (ADULT/PEDIATRIC) - ASU DC SPECIAL INSTRUCTIONSFT
Bone Marrow Biopsy Discharge    Discharge Instructions  - You have had a bone marrow biopsy of your right iliac bone.   - You may shower in 24 hours. No soaking or swimming until the site is completely healed.  - Keep the area covered and dry for the next 24 hours.  - Do not perform any heavy lifting for the next few days or until the site is healed.  - You may resume your normal diet.  - You may resume your normal medications however you should wait 48 hours before restarting aspirin, plavix, or blood thinners.  - It is normal to experience some pain over the site for the next few days. You may take apply ice to the area (20 minutes on, 20 minutes off) and take Tylenol for that pain. Do not take more frequently than every 6 hours and do not exceed more than 3000mg of Tylenol in a 24 hour period.    - You were given conscious sedation which may make you drowsy, therefore you need someone to stay with you until the morning following the procedure.  - Do not drive, engage in heavy lifting or strenuous activity, or drink any alcoholic beverages for the next 24 hours.   - You may resume normal activity in 24 hours.    Notify your primary physician and/or Interventional Radiology IMMEDIATELY if you experience any of the following       - Fever of 100.4F or 38C       - Chills or Rigors/ Shakes       - Swelling and/or Redness in the area around the biopsy site       - Worsening Pain       - Blood soaked bandages or worsening bleeding       - Lightheadedness and/or dizziness upon standing       - Chest Pain/ Tightness       - Shortness of Breath       - Difficulty walking    If you have a problem that you believe requires IMMEDIATE attention, please go to your NEAREST Emergency Room. If you believe your problem can safely wait until you speak to a physician, please call Interventional Radiology for any concerns.    During Normal Weekday Business Hours- You can contact the Interventional Radiology department during normal business hours via telephone.

## 2025-07-11 LAB
FLOW CYTOMETRY FINAL REPORT: SIGNIFICANT CHANGE UP
JAK2 P.V617F BLD/T QL: SIGNIFICANT CHANGE UP

## 2025-07-14 LAB — BCR/ABL BY RT - PCR QUANTITATIVE: SIGNIFICANT CHANGE UP

## 2025-07-15 LAB — SURGICAL PATHOLOGY STUDY: SIGNIFICANT CHANGE UP

## 2025-07-16 LAB — DNA PLOIDY SPEC FC-IMP: SIGNIFICANT CHANGE UP

## 2025-07-18 LAB — CHROM ANALY INTERPHASE BLD FISH-IMP: SIGNIFICANT CHANGE UP

## 2025-07-21 LAB — CHROM ANALY OVERALL INTERP SPEC-IMP: SIGNIFICANT CHANGE UP

## 2025-07-24 LAB — ONKOSIGHT MYELOID SEQUENCE: ABNORMAL
